# Patient Record
Sex: MALE | Race: WHITE | Employment: FULL TIME | URBAN - METROPOLITAN AREA
[De-identification: names, ages, dates, MRNs, and addresses within clinical notes are randomized per-mention and may not be internally consistent; named-entity substitution may affect disease eponyms.]

---

## 2018-07-11 ENCOUNTER — THERAPY VISIT (OUTPATIENT)
Dept: PHYSICAL THERAPY | Facility: CLINIC | Age: 38
End: 2018-07-11
Payer: COMMERCIAL

## 2018-07-11 DIAGNOSIS — G89.29 CHRONIC RIGHT SHOULDER PAIN: Primary | ICD-10-CM

## 2018-07-11 DIAGNOSIS — M25.511 CHRONIC RIGHT SHOULDER PAIN: Primary | ICD-10-CM

## 2018-07-11 PROCEDURE — 97110 THERAPEUTIC EXERCISES: CPT | Mod: GP | Performed by: PHYSICAL THERAPIST

## 2018-07-11 PROCEDURE — 97161 PT EVAL LOW COMPLEX 20 MIN: CPT | Mod: GP | Performed by: PHYSICAL THERAPIST

## 2018-07-11 PROCEDURE — 97112 NEUROMUSCULAR REEDUCATION: CPT | Mod: GP | Performed by: PHYSICAL THERAPIST

## 2018-07-11 NOTE — PROGRESS NOTES
[facility] Initial Evaluation  Subjective:  Patient is a 38 year old male presenting with rehab right shoulder hpi. The history is provided by the patient. No  was used.   Jaison Pinon is a 38 year old male with a right shoulder condition.  Condition occurred with:  Repetition/overuse.  Condition occurred: other and during recreation/sport.  This is a chronic condition  On or about 1/1/2018, I started to have right shoulder pain that did not go away.  The pain is getting worse.  Right handed.  Played V-ball over the winter, not playing now. .    Patient reports pain:  Anterior.  Radiates to:  Shoulder.  Pain is described as aching (dull) and is constant and reported as 1/10 and 4/10.  Associated symptoms:  Loss of strength (popping). Pain is worse in the P.M..  Symptoms are exacerbated by using arm at shoulder level, using arm overhead, lying on extremity and lifting (push pull) and relieved by ice and rest.  Since onset symptoms are gradually worsening.  Special testing: none.  Previous treatment: none.    General health as reported by patient is good.  Pertinent medical history includes:  Concussions/dizziness.  Medical allergies: no.  Other surgeries include:  None reported.  Current medications:  None as reported by the patient.  Current occupation is Writer, school for a masters in communication.  Patient is working in normal job without restrictions.  Primary job tasks include:  Prolonged sitting and prolonged standing.    Barriers: house.    Red flags:  None as reported by the patient.                        Objective:  Standing Alignment:    Cervical/Thoracic:  Forward head and thoracic kyphosis increased (poor sitting posture.)  Shoulder/UE:  Elevated scapula R (internal rotation of shoulders)    Pelvis deviations alignment: external rotation of legs.              Flexibility/Screens:   Positive screens:  ShoulderNegative screens: Cervical   Upper Extremity:     Decreased left upper extremity flexibility at:  Pectoralis Major; Pectoralis Minor and Latissimus    Decreased right upper extremity flexibility present at:  Pectoralis Major; Pectoralis Minor and Latissimus    Spine:      Decreased right spine flexibility:  Upper Trap and Levator                       Shoulder Evaluation:  ROM:  AROM:    Flexion:  Left:  WFL    Right:  WFL painful arc    Abduction:  Left: WFL   Right:  WFL painful arc      External Rotation:  Right:  70          Flexion/External Rotation:  Left:  Lower neck    Right:  Head painful  Extension/Internal Rotation:  Left:  Lower thoracic    Right:  Lumbar painful      Pain: middle trap 4,4-/5/5, lower 3/5    Strength:    Flexion: Left:5/5   Pain:    Right: 4+/5      Pain:  -/+    Abduction:  Left: 5/5  Pain:    Right: 4+/5      Pain:+  Adduction:  Left: 5/5    Pain:    Right: 5/5     Pain:  Internal Rotation:  Left:5/5     Pain:    Right: 5/5     Pain:  External Rotation:   Left:5/5     Pain:   Right:5/5     Pain:        Elbow Flexion:  Left:5/5     Pain:    Right:5/5     Pain:  Elbow Extension:  Left:4+/5     Pain:    Right:4+/5     Pain:  Stability Testing:      Left shoulder stability negative testing:  Sulcus sign    Right shoulder stability negative testing:  Sulcus sign  Special Tests:      Left shoulder negative for the following special tests:  Impingement  Right shoulder positive for the following special tests:Impingement and Acrimioclavicular  Right shoulder negative for the following special tests:Bursal  Palpation:  Palpation assessed shoulder: pectoralis major minor.    Right shoulder tenderness present at: Acrimioclavicular; Supraspinatus and Subscapularis  Mobility Tests:      Glenohumeral posterior right:  Hypomobile  Glenohumeral inferior right:  Hypomobile          Scapulohumeral rhythm right:  Hypermobile                                   General     ROS    Assessment/Plan:    Patient is a 38 year old male with right side shoulder  complaints.    Patient has the following significant findings with corresponding treatment plan.                Diagnosis 1:  Right shoulder impingement with scapular weakness.  Pain -  manual therapy, self management, education and home program  Decreased joint mobility - manual therapy, therapeutic exercise, therapeutic activity and home program  Decreased strength - therapeutic exercise, therapeutic activities and home program  Impaired muscle performance - neuro re-education and home program  Impaired posture - neuro re-education, therapeutic activities and home program    Therapy Evaluation Codes:   1) History comprised of:   Personal factors that impact the plan of care:      Past/current experiences, Time since onset of symptoms and recreational activity such as volleyball.    Comorbidity factors that impact the plan of care are:      None.     Medications impacting care: None.  2) Examination of Body Systems comprised of:   Body structures and functions that impact the plan of care:      Shoulder.   Activity limitations that impact the plan of care are:      Dressing, Lifting, Throwing, Sleeping and reaching.  3) Clinical presentation characteristics are:   Stable/Uncomplicated.  4) Decision-Making    Low complexity using standardized patient assessment instrument and/or measureable assessment of functional outcome.  Cumulative Therapy Evaluation is: Low complexity.    Previous and current functional limitations:  (See Goal Flow Sheet for this information)    Short term and Long term goals: (See Goal Flow Sheet for this information)     Communication ability:  Patient appears to be able to clearly communicate and understand verbal and written communication and follow directions correctly.  Treatment Explanation - The following has been discussed with the patient:   RX ordered/plan of care  Possible risks and side effects  This patient would benefit from PT intervention to resume normal activities.   Rehab  potential is good.    Frequency:  1 X week, once daily  Duration:  for 6 weeks  Discharge Plan:  Achieve all LTG.  Independent in home treatment program.    Please refer to the daily flowsheet for treatment today, total treatment time and time spent performing 1:1 timed codes.

## 2018-07-11 NOTE — LETTER
[facility]  [address]  [place] [address]  [phone]    2018    Re: Jaison Pinon   :   1980  MRN:  [id number]   REFERRING PHYSICIAN:   Referred Self    [facility]    Date of Initial Evaluation:  2018  Visits:  Rxs Used: 1  Reason for Referral:  Chronic right shoulder pain    EVALUATION SUMMARY    Subjective:  Patient is a 38 year old male presenting with rehab right shoulder hpi. The history is provided by the patient. No  was used.   Jaison Pinon is a 38 year old male with a right shoulder condition.  Condition occurred with:  Repetition/overuse.  Condition occurred: other and during recreation/sport.  This is a chronic condition  On or about 2018, I started to have right shoulder pain that did not go away.  The pain is getting worse.  Right handed.  Played V-ball over the winter, not playing now. .    Patient reports pain:  Anterior.  Radiates to:  Shoulder.  Pain is described as aching (dull) and is constant and reported as 1/10 and 4/10.  Associated symptoms:  Loss of strength (popping). Pain is worse in the P.M..  Symptoms are exacerbated by using arm at shoulder level, using arm overhead, lying on extremity and lifting (push pull) and relieved by ice and rest.  Since onset symptoms are gradually worsening.  Special testing: none.  Previous treatment: none.    General health as reported by patient is good.  Pertinent medical history includes:  Concussions/dizziness.  Medical allergies: no.  Other surgeries include:  None reported.  Current medications:  None as reported by the patient.  Current occupation is Writer, school for a masters in communication.  Patient is working in normal job without restrictions.  Primary job tasks include:  Prolonged sitting and prolonged standing.  Barriers: house.  Red flags:  None as  reported by the patient.                  Objective:  Standing Alignment:    Cervical/Thoracic:  Forward head and thoracic kyphosis increased (poor sitting posture.)  Shoulder/UE:  Elevated scapula R (internal rotation of shoulders)  Pelvis deviations alignment: external rotation of legs.  Flexibility/Screens:   Positive screens:  ShoulderNegative screens: Cervical   Upper Extremity:    Decreased left upper extremity flexibility at:  Pectoralis Major; Pectoralis Minor and Latissimus    Re: Jaison Pinon   :   1980    Decreased right upper extremity flexibility present at:  Pectoralis Major; Pectoralis Minor and Latissimus  Spine:  Decreased right spine flexibility:  Upper Trap and Levator    Shoulder Evaluation:  ROM:  AROM:    Flexion:  Left:  WFL    Right:  WFL painful arc  Abduction:  Left: WFL   Right:  WFL painful arc  External Rotation:  Right:  70  Flexion/External Rotation:  Left:  Lower neck    Right:  Head painful  Extension/Internal Rotation:  Left:  Lower thoracic    Right:  Lumbar painful    Pain: middle trap 4,4-/5/5, lower 3/5  Strength:    Flexion: Left:5/5   Pain:    Right: 4+/5      Pain:  -/+  Abduction:  Left: 5/5  Pain:    Right: 4+/5      Pain:+  Adduction:  Left: 5/5    Pain:    Right: 5/5     Pain:  Internal Rotation:  Left:5/5     Pain:    Right: 5/5     Pain:  External Rotation:   Left:5/5     Pain:   Right:5/5     Pain:    Elbow Flexion:  Left:5/5     Pain:    Right:5/5     Pain:  Elbow Extension:  Left:4+/5     Pain:    Right:4+/5     Pain:  Stability Testing:    Left shoulder stability negative testing:  Sulcus sign  Right shoulder stability negative testing:  Sulcus sign  Special Tests:    Left shoulder negative for the following special tests:  Impingement  Right shoulder positive for the following special tests:Impingement and Acrimioclavicular  Right shoulder negative for the following special tests:Bursal  Palpation:  Palpation assessed shoulder: pectoralis major  minor.  Right shoulder tenderness present at: Acrimioclavicular; Supraspinatus and Subscapularis  Mobility Tests:    Glenohumeral posterior right:  Hypomobile  Glenohumeral inferior right:  Hypomobile    Scapulohumeral rhythm right:  Hypermobile       Assessment/Plan:    Patient is a 38 year old male with right side shoulder complaints.    Patient has the following significant findings with corresponding treatment plan.                Diagnosis 1:  Right shoulder impingement with scapular weakness.  Pain -  manual therapy, self management, education and home program  Decreased joint mobility - manual therapy, therapeutic exercise, therapeutic activity and home program  Decreased strength - therapeutic exercise, therapeutic activities and home program  Impaired muscle performance - neuro re-education and home program  Impaired posture - neuro re-education, therapeutic activities and home program    Therapy Evaluation Codes:   1) History comprised of:   Personal factors that impact the plan of care:    Re: Jaison Pinon   :   1980      Past/current experiences, Time since onset of symptoms and recreational activity such as volleyball.    Comorbidity factors that impact the plan of care are:      None.     Medications impacting care: None.  2) Examination of Body Systems comprised of:   Body structures and functions that impact the plan of care:      Shoulder.   Activity limitations that impact the plan of care are:      Dressing, Lifting, Throwing, Sleeping and reaching.  3) Clinical presentation characteristics are:   Stable/Uncomplicated.  4) Decision-Making    Low complexity using standardized patient assessment instrument and/or measureable assessment of functional outcome.  Cumulative Therapy Evaluation is: Low complexity.    Previous and current functional limitations:  (See Goal Flow Sheet for this information)    Short term and Long term goals: (See Goal Flow Sheet for this information)      Communication ability:  Patient appears to be able to clearly communicate and understand verbal and written communication and follow directions correctly.  Treatment Explanation - The following has been discussed with the patient:   RX ordered/plan of care  Possible risks and side effects  This patient would benefit from PT intervention to resume normal activities.   Rehab potential is good.    Frequency:  1 X week, once daily  Duration:  for 6 weeks  Discharge Plan:  Achieve all LTG.  Independent in home treatment program.    Thank you for your referral.    INQUIRIES  Therapist: Macrina Santos, PT  [facility] PHYSICAL THERAPY  [address] [address]  Phone: [phone]  Fax: [phone]

## 2018-07-11 NOTE — MR AVS SNAPSHOT
"              After Visit Summary   2018    Jaison Pinon    MRN: [id number]           Patient Information     Date Of Birth          1980        Visit Information        Provider Department      2018 8:20 AM [facility]        Today's Diagnoses     Chronic right shoulder pain    -  1       Follow-ups after your visit        Who to contact     If you have questions or need follow up information about today's clinic visit or your schedule please [facility] directly at [phone].  Normal or non-critical lab and imaging results will be communicated to you by Thing5hart, letter or phone within 4 business days after the clinic has received the results. If you do not hear from us within 7 days, please contact the clinic through Thing5hart or phone. If you have a critical or abnormal lab result, we will notify you by phone as soon as possible.  Submit refill requests through Limecraft or call your pharmacy and they will forward the refill request to us. Please allow 3 business days for your refill to be completed.          Additional Information About Your Visit        MyChart Information     Limecraft lets you send messages to your doctor, view your test results, renew your prescriptions, schedule appointments and more. To sign up, go to [email or web address] . Click on \"Log in\" on the left side of the screen, which will take you to the Welcome page. Then click on \"Sign up Now\" on the right side of the page.     You will be asked to enter the access code listed below, as well as some personal information. Please follow the directions to create your username and password.     Your access code is: [email or web address]  Expires: 10/9/2018 10:07 PM     Your access code will  in 90 days. If you need help or a new code, please call your [place] clinic or [phone].        Care " EveryWhere ID     This is your Care EveryWhere ID. This could be used by other organizations to access your [place] medical records  [id number]         Blood Pressure from Last 3 Encounters:   No data found for BP    Weight from Last 3 Encounters:   No data found for Wt              We Performed the Following     SRIDHAR Inital Eval Report     Neuromuscular Re-Education     PT Eval, Low Complexity (91949)     Therapeutic Exercises        Primary Care Provider Fax #    Physician No Ref-Primary [phone]       No address on file        Equal Access to Services     MARYLOU SQUIRES : Hadii aad ku hadasho Soomaali, waaxda luqadaha, qaybta kaalmada adeegyada, waxay idiin hayaan adeeg nicolasararoberto lahugo . So [facility] [phone].    ATENCIÓN: Si habla español, tiene a da silva disposición servicios gratuitos de asistencia lingüística. [facility] [phone].    We comply with applicable federal civil rights laws and [place] laws. We do not discriminate on the basis of race, color, national origin, age, disability, sex, sexual orientation, or gender identity.            Thank you!     Thank you for choosing [facility] PHYSICAL THERAPY  for your care. Our goal is always to provide you with excellent care. Hearing back from our patients is one way we can continue to improve our services. Please take a few minutes to complete the written survey that you may receive in the mail after your visit with us. Thank you!             Your Updated Medication List - Protect others around you: Learn how to safely use, store and throw away your medicines at www.disposemymeds.org.      Notice  As of 7/11/2018 10:07 PM    You have not been prescribed any medications.

## 2018-07-27 ENCOUNTER — THERAPY VISIT (OUTPATIENT)
Dept: PHYSICAL THERAPY | Facility: CLINIC | Age: 38
End: 2018-07-27
Payer: COMMERCIAL

## 2018-07-27 DIAGNOSIS — G89.29 CHRONIC RIGHT SHOULDER PAIN: ICD-10-CM

## 2018-07-27 DIAGNOSIS — M25.511 CHRONIC RIGHT SHOULDER PAIN: ICD-10-CM

## 2018-07-27 PROCEDURE — 97112 NEUROMUSCULAR REEDUCATION: CPT | Mod: GP | Performed by: PHYSICAL THERAPY ASSISTANT

## 2018-07-27 PROCEDURE — 97110 THERAPEUTIC EXERCISES: CPT | Mod: GP | Performed by: PHYSICAL THERAPY ASSISTANT

## 2018-08-17 ENCOUNTER — THERAPY VISIT (OUTPATIENT)
Dept: PHYSICAL THERAPY | Facility: CLINIC | Age: 38
End: 2018-08-17
Payer: COMMERCIAL

## 2018-08-17 DIAGNOSIS — M25.511 CHRONIC RIGHT SHOULDER PAIN: ICD-10-CM

## 2018-08-17 DIAGNOSIS — G89.29 CHRONIC RIGHT SHOULDER PAIN: ICD-10-CM

## 2018-08-17 PROCEDURE — 97112 NEUROMUSCULAR REEDUCATION: CPT | Mod: GP | Performed by: PHYSICAL THERAPY ASSISTANT

## 2018-08-17 PROCEDURE — 97110 THERAPEUTIC EXERCISES: CPT | Mod: GP | Performed by: PHYSICAL THERAPY ASSISTANT

## 2018-08-17 NOTE — MR AVS SNAPSHOT
"              After Visit Summary   2018    Jaison Pinon    MRN: [id number]           Patient Information     Date Of Birth          1980        Visit Information        Provider Department      2018 7:40 AM [facility] Physical Therapy        Today's Diagnoses     Chronic right shoulder pain           Follow-ups after your visit        Who to contact     If you have questions or need follow up information about today's clinic visit or your schedule please contact [facility] directly at [phone].  Normal or non-critical lab and imaging results will be communicated to you by Appspersehart, letter or phone within 4 business days after the clinic has received the results. If you do not hear from us within 7 days, please contact the clinic through Appspersehart or phone. If you have a critical or abnormal lab result, we will notify you by phone as soon as possible.  Submit refill requests through [employer] or call your pharmacy and they will forward the refill request to us. Please allow 3 business days for your refill to be completed.          Additional Information About Your Visit        MyChart Information     [employer] lets you send messages to your doctor, view your test results, renew your prescriptions, schedule appointments and more. To sign up, go to [email or web address] . Click on \"Log in\" on the left side of the screen, which will take you to the Welcome page. Then click on \"Sign up Now\" on the right side of the page.     You will be asked to enter the access code listed below, as well as some personal information. Please follow the directions to create your username and password.     Your access code is: [email or web address]  Expires: 10/9/2018 10:07 PM     Your access code will  in 90 days. If you need help or a new code, please call your [place] clinic or [phone].        Care " EveryWhere ID     This is your Care EveryWhere ID. This could be used by other organizations to access your [place] medical records  [id number]         Blood Pressure from Last 3 Encounters:   No data found for BP    Weight from Last 3 Encounters:   No data found for Wt              We Performed the Following     NEUROMUSCULAR RE-EDUCATION     THERAPEUTIC EXERCISES        Primary Care Provider Fax #    Physician No Ref-Primary [phone]       No address on file        Equal Access to Services     [facility] : Hadii aad ku hadasho Soomaali, waaxda luqadaha, qaybta kaalmada adeegyada, waxay idiin hayaan adeeg kb laKerilance . So [facility] [phone].    ATENCIÓN: Si habla esplonnie, tiene a da silva disposición servicios gratuitos de asistencia lingüística. Donyanatalie al [phone].    We comply with applicable federal civil rights laws and [place] laws. We do not discriminate on the basis of race, color, national origin, age, disability, sex, sexual orientation, or gender identity.            Thank you!     Thank you for [facility] PHYSICAL THERAPY  for your care. Our goal is always to provide you with excellent care. Hearing back from our patients is one way we can continue to improve our services. Please take a few minutes to complete the written survey that you may receive in the mail after your visit with us. Thank you!             Your Updated Medication List - Protect others around you: Learn how to safely use, store and throw away your medicines at www.disposemymeds.org.      Notice  As of 8/17/2018  5:51 PM    You have not been prescribed any medications.

## 2018-11-23 PROBLEM — M25.511 CHRONIC RIGHT SHOULDER PAIN: Status: RESOLVED | Noted: 2018-07-11 | Resolved: 2018-11-23

## 2018-11-23 PROBLEM — G89.29 CHRONIC RIGHT SHOULDER PAIN: Status: RESOLVED | Noted: 2018-07-11 | Resolved: 2018-11-23

## 2018-11-23 NOTE — PROGRESS NOTES
Subjective:  HPI                    Objective:  System    Physical Exam    General     ROS    Assessment/Plan:    ASSESSMENT/PLAN  Updated problem list and treatment plan: Diagnosis 1:  Right shoulder pain   Pain -  self management and home program  Decreased joint mobility - home program and self management  Decreased strength - home program and self management  Decreased function - home program and self management  Impaired posture - home program and self management  STG/LTGs have been met:  Yes (See Goal flow sheet completed today.)  Progress toward STG/LTGs have been made:  Yes (See Goal flow sheet completed today.)  Assessment of Progress: The patient's condition is improving.  The patient has not returned to therapy. Current status is unknown.  Self Management Plans:  Patient is independent in a home treatment program.  Patient is independent in self management of symptoms.    Recommendations:  This patient is ready to be discharged from therapy and continue their home treatment program.  Patient reported doing really well and wanted to exercises on his own.  Patient to call if any questions.  The progress note/discharge summary was written in collaboration with and reviewed by the physical therapist.    Please refer to the daily flowsheet for treatment today, total treatment time and time spent performing 1:1 timed codes.

## 2019-01-04 ENCOUNTER — VIRTUAL VISIT (OUTPATIENT)
Dept: FAMILY MEDICINE | Facility: OTHER | Age: 39
End: 2019-01-04

## 2019-01-04 NOTE — PROGRESS NOTES
"Date:   Clinician: Sonido Crystal  Clinician NPI: [id number]  Patient: Jaison Pinon  Patient : 1980  Patient Address: [address] [place] [address]  Patient Phone: [phone]  Visit Protocol: Oral mouth sores  Patient Summary:  Jaison is a 38 year old ( : 1980 ) male who initiated a Visit for evaluation of oral mouth sores, specifically canker sore. When asked the question \"Please sign me up to receive news, health information and promotions from [email or web address].\", Jaison responded \"No\".    Jaison uploaded images of his condition.   He currently has lip sores. The current sores have been present for 3 days. He notes redness and pain at the affected area. The sores are recurrent and not spreading to other parts of the body. The sores are unilateral and are not grouped in a cluster of lesions. His pain did not precede the appearance of the sores. He has had 3 occurrences in the past three months.   Jaison last had an outbreak 1-2 months ago and has tried to treat the sores in the past. Medication(s) he has used to treat the sores in the past as reported by the patient (free-text): Canker-X, but it?s ineffective for this one   He denies: pus, warmth around the affected skin area, feeling feverish, that the rash is spreading, and having a similar rash on other body parts. The rash is not located near the eyes.   The patient does not smoke or use smokeless tobacco.   MEDICATIONS: No current medications, ALLERGIES: NKDA  Clinician Response:  Dear Jaison,  Based on the information provided, the lesions or sores you have are most likely cold sores, also known as fever blisters.  The technical term is 'herpes simplex virus type 1'. Common symptoms include a tingling sensation on the mouth or lips, fluid filled blisters, or crusting on the skin.   I am prescribing:   Valacyclovir 1 gram oral tablet. Take 2 tablets by mouth every 12 hours for 1 day. If you think your oral sores are " returning, refill the medication and use it according to the instructions. Your prescription includes 1 refill.  To avoid spreading cold sores to other people, or to other parts of your body, try not to itch or scratch the sores.  Scratching or itching the sores may also cause the development of a secondary infection. Be sure to wash your hands with soap and water after touching the sores.  If cold sores do not heal within 2 weeks, please see your healthcare provider  for further evaluation.   Diagnosis: Herpes Simplex (Cold Sores)  Diagnosis ICD: B00.9  Prescription: valacyclovir (Valtrex) 1 gram oral tablet 4 tablet, 1 days supply. Take 2 tablets by mouth every 12 hours for 1 day. Refills: 1, Refill as needed: no, Allow substitutions: yes  Pharmacy: [id number] - [phone] - [address] [place] [address]

## 2019-06-12 ENCOUNTER — COMMUNICATION - HEALTHEAST (OUTPATIENT)
Dept: TELEHEALTH | Facility: CLINIC | Age: 39
End: 2019-06-12

## 2019-06-12 ENCOUNTER — OFFICE VISIT - HEALTHEAST (OUTPATIENT)
Dept: INTERNAL MEDICINE | Facility: CLINIC | Age: 39
End: 2019-06-12

## 2019-06-12 DIAGNOSIS — Z00.00 ROUTINE GENERAL MEDICAL EXAMINATION AT A HEALTH CARE FACILITY: ICD-10-CM

## 2019-06-12 DIAGNOSIS — G89.29 CHRONIC RIGHT SHOULDER PAIN: ICD-10-CM

## 2019-06-12 DIAGNOSIS — J30.89 SEASONAL ALLERGIC RHINITIS DUE TO OTHER ALLERGIC TRIGGER: ICD-10-CM

## 2019-06-12 DIAGNOSIS — M25.511 CHRONIC RIGHT SHOULDER PAIN: ICD-10-CM

## 2019-06-12 ASSESSMENT — MIFFLIN-ST. JEOR: SCORE: 1884.89

## 2019-09-16 ENCOUNTER — THERAPY VISIT (OUTPATIENT)
Dept: PHYSICAL THERAPY | Facility: CLINIC | Age: 39
End: 2019-09-16
Payer: COMMERCIAL

## 2019-09-16 DIAGNOSIS — M25.511 CHRONIC RIGHT SHOULDER PAIN: ICD-10-CM

## 2019-09-16 DIAGNOSIS — G89.29 CHRONIC RIGHT SHOULDER PAIN: ICD-10-CM

## 2019-09-16 PROCEDURE — 97161 PT EVAL LOW COMPLEX 20 MIN: CPT | Mod: GP | Performed by: PHYSICAL THERAPIST

## 2019-09-16 PROCEDURE — 97110 THERAPEUTIC EXERCISES: CPT | Mod: GP | Performed by: PHYSICAL THERAPIST

## 2019-09-16 PROCEDURE — 97140 MANUAL THERAPY 1/> REGIONS: CPT | Mod: GP | Performed by: PHYSICAL THERAPIST

## 2019-09-16 NOTE — LETTER
[facility]  [address]  [facility] [address]  [phone]    2019    Re: Jaison Pinon   :   1980  MRN:  [id number]   REFERRING PHYSICIAN:   Theo Rodriguez    [facility]    Date of Initial Evaluation: 2019  Visits:  Rxs Used: 1  Reason for Referral:  Chronic right shoulder pain    EVALUATION SUMMARY    Subjective:  The history is provided by the patient. No  was used.   Type of problem:  Right shoulder  This is a chronic condition   Problem details: I played volleyball and hurt my shoulder on or about 2018.  No fall, but possible from volleyball.  I had some shoulder pain.  I saw a chiropractor for the neck.  I had therapy which helped with being able to push on the arm and get up,  but the over head is not any better.  I am not playing volleyball due to the right shoulder pain.  .   Patient reports pain:  Anterior (superior). Radiates to:  Upper arm and shoulder. Associated with: clicking, numbness in bilateral arms when running and sleeping. Symptoms are exacerbated by lying on extremity, using arm at shoulder level, using arm overhead, lifting and carrying (running) and relieved by rest.  Jaison Pinon being seen for right shoulder pain .   Problem began 9/10/2019. Where condition occurred: during recreation / sport.Problem occurred: volleyball   and reported as 4/10 (ranges 1-6) on pain scale. General health as reported by patient is excellent. Pertinent medical history includes:  Concussions/dizziness.   Other medical allergies details: none.    Current medications:  Anti-inflammatory.   Primary job tasks include:  Prolonged sitting and computer work.  Pain quality: dull. and is constant. Pain is the same all the time. Since onset symptoms are unchanged. Special tests:  MRI (reported normal). Previous treatment includes  physical therapy and chiropractic. There was mild improvement following previous treatment.   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  Barriers include:  None as reported by patient.  Red flags:  None as reported by patient.                  Objective:  Standing Alignment:    Cervical/Thoracic:  Forward head  Shoulder/UE:  Rounded shoulders (internal rotation of bilateral shoulders)  Flexibility/Screens:   Positive screens:  ShoulderNegative screens: Cervical or Thoracic (tightness)   Upper Extremity:    Decreased left upper extremity flexibility at:  Pectoralis Major; Pectoralis Minor and   Re: Jaison Pinon   :   1980    Latissimus  Decreased right upper extremity flexibility present at:  Pectoralis Major; Pectoralis Minor and Latissimus    Shoulder Evaluation:  ROM:  AROM:    Flexion:  Left:  WFL    Right:  WFL pain at 90 through end range  Abduction:  Left: WFL   Right:  WFL, pain at ninety and to end rnage   Internal Rotation:  Right:  WFL  External Rotation:  Right:  75  Flexion/External Rotation:  Left:  Neck    Right:  Neck   Extension/Internal Rotation:  Left:  Lumbar    Right:  Lumbar    Pain: upper trap 5/5, middle trap 4+/5, lower trap 3+/5  Stability Testing:    Right shoulder stability positive testing:Sulcus sign  Special Tests:    Right shoulder positive for the following special tests:Impingement  Right shoulder negative for the following special tests:Acrimioclavicular  Palpation:    Right shoulder tenderness present at: Biceps; Supraspinatus and Bicipital Groove  Mobility Tests:    Glenohumeral posterior right:  Hypomobile  Glenohumeral inferior right:  Hypomobile    Acromioclavicular right:  Normal    Scapulohumeral rhythm right:  Hypermobile        Assessment/Plan:    Patient is a 39 year old male with right side shoulder complaints.    Patient has the following significant findings with corresponding treatment plan.                 Diagnosis 1:  Right shoulder pain   Pain -  manual therapy, self management, education and home program  Decreased joint mobility - manual therapy, therapeutic exercise, therapeutic activity and home program  Decreased strength - therapeutic exercise, therapeutic activities and home program  Impaired muscle performance - neuro re-education and home program  Decreased function - therapeutic activities and home program  Impaired posture - neuro re-education, therapeutic activities and home program    Therapy Evaluation Codes:   Cumulative Therapy Evaluation is: Low complexity.    Previous and current functional limitations:  (See Goal Flow Sheet for this information)    Short term and Long term goals: (See Goal Flow Sheet for this information)     Communication ability:  Patient appears to be able to clearly communicate and understand verbal and written communication and follow directions correctly.  Treatment Explanation - The following has been discussed with the patient:   RX ordered/plan of care  Possible risks and side effects  This patient would benefit from PT intervention to resume normal activities.   Re: Jaison Pinon   :   1980    Rehab potential is good.    Frequency:  1 X week, once daily  Duration:  for 6 weeks  Discharge Plan:  Achieve all LTG.  Independent in home treatment program.    Thank you for your referral.    INQUIRIES  Therapist: Macrina Santos, PT  [facility]  [address] [address]  Phone: [phone]  Fax: [phone]

## 2019-12-11 PROBLEM — M25.511 CHRONIC RIGHT SHOULDER PAIN: Status: RESOLVED | Noted: 2019-09-16 | Resolved: 2019-12-11

## 2019-12-11 PROBLEM — G89.29 CHRONIC RIGHT SHOULDER PAIN: Status: RESOLVED | Noted: 2019-09-16 | Resolved: 2019-12-11

## 2020-03-11 ENCOUNTER — HEALTH MAINTENANCE LETTER (OUTPATIENT)
Age: 40
End: 2020-03-11

## 2021-01-03 ENCOUNTER — HEALTH MAINTENANCE LETTER (OUTPATIENT)
Age: 41
End: 2021-01-03

## 2021-04-25 ENCOUNTER — HEALTH MAINTENANCE LETTER (OUTPATIENT)
Age: 41
End: 2021-04-25

## 2021-05-29 NOTE — PROGRESS NOTES
ASSESSMENT/PLAN:    1. Routine general medical examination  His history and examination reveal no acute issues. We discussed general health evaluation needs.  Testing is not clearly necessary at this time, with his history and examination.  He can be seen prn and every few years until age 49.     2. Seasonal allergic rhinitis   OTC treatment.     3. Chronic right shoulder pain  This is mild, with good ROM.  No treatment appears indicated.     CHIEF COMPLAINT:  Chief Complaint   Patient presents with     Annual Exam     HISTORY OF PRESENT ILLNESS:  Jaison is a 39 y.o. male presenting to the clinic today for general health evaluation and exam. He feels well overall.  No symptoms that are troublesome.  History of shoulder pain, with injury. Is quite better now, with only occasional discomfort, and not activity limiting.      REVIEW OF SYSTEMS:   Constitutional: no fever, chills, or sweats  Respiratory: No wheezes, cough, shortness of breath  Cardiovascular: No chest pain or palpitations  Gastrointestinal: No nausea, vomiting, diarrhea, dyspepsia, or pain  Musculoskeletal: see HPI  Neurological: No headache, arm or leg numbness or weakness, or gait disturbance  All other systems on reveiw are negative.    PFSH:  Social History     Tobacco Use   Smoking Status Never Smoker   Smokeless Tobacco Never Used     Family History   Problem Relation Age of Onset     Hypertension Mother      Lymphoma Paternal Uncle      Social History     Socioeconomic History     Marital status:      Spouse name: Not on file     Number of children: Not on file     Years of education: Not on file     Highest education level: Not on file   Occupational History     Not on file   Social Needs     Financial resource strain: Not on file     Food insecurity:     Worry: Not on file     Inability: Not on file     Transportation needs:     Medical: Not on file     Non-medical: Not on file   Tobacco Use     Smoking status: Never Smoker      "Smokeless tobacco: Never Used   Substance and Sexual Activity     Alcohol use: Yes     Drug use: Not on file     Sexual activity: Not on file   Lifestyle     Physical activity:     Days per week: Not on file     Minutes per session: Not on file     Stress: Not on file   Relationships     Social connections:     Talks on phone: Not on file     Gets together: Not on file     Attends [employer] service: Not on file     Active member of club or organization: Not on file     Attends meetings of clubs or organizations: Not on file     Relationship status: Not on file     Intimate partner violence:     Fear of current or ex partner: Not on file     Emotionally abused: Not on file     Physically abused: Not on file     Forced sexual activity: Not on file   Other Topics Concern     Not on file   Social History Narrative     Not on file     Past Surgical History:   Procedure Laterality Date     NO PAST SURGERIES       Allergies   Allergen Reactions     Cat Dander Itching     Past Medical History:   Diagnosis Date     Allergic rhinitis      VITALS:  Vitals:    06/12/19 1304   BP: 102/78   Patient Site: Left Arm   Patient Position: Sitting   Cuff Size: Adult Regular   Pulse: 70   SpO2: 99%   Weight: 198 lb 4 oz (89.9 kg)   Height: 6' 3\" (1.905 m)     Wt Readings from Last 3 Encounters:   06/12/19 198 lb 4 oz (89.9 kg)     Body mass index is 24.78 kg/m .  PHYSICAL EXAM:  General Appearance: In no acute distress  /78 (Patient Site: Left Arm, Patient Position: Sitting, Cuff Size: Adult Regular)   Pulse 70   Ht 6' 3\" (1.905 m)   Wt 198 lb 4 oz (89.9 kg)   SpO2 99%   BMI 24.78 kg/m    EYES: Clear, without inflammation, fundi are unremarkable, discs flat   HEENT: Without congestion or inflammation  NECK:  without cervical or axillary adenopathy, thyroid normal  RESPIRATORY: Clear to auscultation  CARDIOVASCULAR: S1, S2, without murmur   ABDOMEN: soft, flat, and non-tender, without mass, rebound, or guarding  RECTAL: " deferred  GENITOURINARY: normal testes and phallus  EXTREMITIES: No joint swelling, no ulcer or edema, I don't find local tenderness on right shoulder.   NEUROLOGIC: Non-focal, no arm or leg  weakness, speech is clear, gait is normal  PSYCHIATRIC: Oriented X 3, without confusion, behavior and affect normal, thinking is clear

## 2021-06-03 VITALS — WEIGHT: 198.25 LBS | HEIGHT: 75 IN | BODY MASS INDEX: 24.65 KG/M2

## 2021-09-22 ENCOUNTER — OFFICE VISIT (OUTPATIENT)
Dept: URGENT CARE | Facility: URGENT CARE | Age: 41
End: 2021-09-22
Payer: COMMERCIAL

## 2021-09-22 VITALS
WEIGHT: 204 LBS | DIASTOLIC BLOOD PRESSURE: 87 MMHG | HEART RATE: 61 BPM | BODY MASS INDEX: 25.5 KG/M2 | SYSTOLIC BLOOD PRESSURE: 137 MMHG | TEMPERATURE: 96.3 F | OXYGEN SATURATION: 97 %

## 2021-09-22 DIAGNOSIS — S01.81XA LACERATION OF FOREHEAD, INITIAL ENCOUNTER: Primary | ICD-10-CM

## 2021-09-22 PROCEDURE — 90715 TDAP VACCINE 7 YRS/> IM: CPT

## 2021-09-22 PROCEDURE — 99207 PR NON-BILLABLE SERV PER CHARTING: CPT

## 2021-09-22 PROCEDURE — 12011 RPR F/E/E/N/L/M 2.5 CM/<: CPT

## 2021-09-22 PROCEDURE — 90471 IMMUNIZATION ADMIN: CPT

## 2021-09-22 ASSESSMENT — ENCOUNTER SYMPTOMS: WOUND: 1

## 2021-09-23 NOTE — PROGRESS NOTES
SUBJECTIVE:   Jaison Pinon is a 41 year old male presenting with a chief complaint of   Chief Complaint   Patient presents with     Urgent Care     Laceration     x 2 hours ran into door       He is an established patient of [facility].  Patient presents with forehead injury after walking into a door frame.  No LOC, no other injury.  Unknown tetnus status.      PMH x:  None  Medications:  None  Allergies:  None  Surgeries:  none        Review of Systems   Skin: Positive for wound.       History reviewed. No pertinent past medical history.  Family History   Problem Relation Age of Onset     Hypertension Mother      Lymphoma Paternal Uncle      No current outpatient medications on file.     Social History     Tobacco Use     Smoking status: Never Smoker     Smokeless tobacco: Never Used   Substance Use Topics     Alcohol use: Yes       OBJECTIVE  /87 (BP Location: Right arm, Patient Position: Sitting, Cuff Size: Adult Regular)   Pulse 61   Temp (!) 96.3  F (35.7  C) (Tympanic)   Wt 92.5 kg (204 lb)   SpO2 97%   BMI 25.50 kg/m      Physical Exam  Vitals and nursing note reviewed.   Constitutional:       Appearance: Normal appearance. He is normal weight.   Eyes:      Extraocular Movements: Extraocular movements intact.      Conjunctiva/sclera: Conjunctivae normal.      Pupils: Pupils are equal, round, and reactive to light.   Cardiovascular:      Rate and Rhythm: Normal rate.   Musculoskeletal:      Comments: Above right eye brow, vertical 1.2 cm very superficial lac.  No edema, ecchymosis.   Skin:     General: Skin is warm and dry.      Findings: No bruising.   Neurological:      General: No focal deficit present.      Mental Status: He is alert.         Labs:  No results found for this or any previous visit (from the past 24 hour(s)).    X-Ray was not done.    ASSESSMENT:    No diagnosis found.     Medical Decision Making:    Differential Diagnosis:  lac    Serious Comorbid Conditions:  Adult:   None    PLAN:    PROCEDURE:  skin cleaned and irrigated.  Dermabond applied and procedure well tolerated by patient.  No creams or lotions to dermabond.  Tylenol/motrin prn.  Tetnus updated.    Followup:    If not improving or if condition worsens, follow up with your Primary Care Provider, If not improving or if conditions worsens over the next 12-24 hours, go to the Emergency Department    There are no Patient Instructions on file for this visit.

## 2021-09-23 NOTE — PATIENT INSTRUCTIONS
Patient Education     Laceration, Skin Adhesive  A laceration is a cut through the skin. You have a laceration that your healthcare provider has closed with skin adhesive, a type of skin glue.  Home care  You may take over-the-counter medicine such as acetaminophen, naproxen, or ibuprofen for pain, unless your provider prescribed another pain medicine. Talk with your healthcare provider before using these medicines if you have chronic liver or kidney disease or ever had a stomach ulcer or gastrointestinal bleeding.  General care    Keep the wound clean and dry. You may shower or bathe as usual, but don't use soaps, lotions, or ointments on the wound area. Don't scrub the wound. After bathing, pat the wound dry with a soft towel.    Don't scratch, rub, or pick at the adhesive film. Don't place tape directly over the film.    Don't apply liquids such as peroxide, ointments, or creams to the wound while the film is in place. These may dissolve the adhesive too soon.    Most skin wounds heal without problems. However, an infection sometimes occurs despite correct treatment. Watch for the signs of infection listed below.    Follow-up care  Follow up with your healthcare provider, or as advised. The adhesive film or skin glue usually falls off in 5 to 10 days.  When to seek medical advice  Call your healthcare provider right away if any of these occur:    Signs of infection:  ? Fever of 100.4 F (38 C) or higher, or as advised by your healthcare provider  ? Increasing pain in the wound  ? Increasing redness or swelling  ? Pus coming from the wound    Wound bleeds more than a small amount or bleeding doesn t stop    Glue comes off earlier than expected and the wound edges come apart    You feel numbness or weakness in the wound area that doesn t go away  Wendy last reviewed this educational content on 8/1/2019 2000-2021 [employer]. All rights reserved. This information is not intended as a substitute  for professional medical care. Always follow your healthcare professional's instructions.

## 2021-10-10 ENCOUNTER — HEALTH MAINTENANCE LETTER (OUTPATIENT)
Age: 41
End: 2021-10-10

## 2022-05-21 ENCOUNTER — HEALTH MAINTENANCE LETTER (OUTPATIENT)
Age: 42
End: 2022-05-21

## 2022-07-11 NOTE — PROGRESS NOTES
[facility] Initial Evaluation  Subjective:  The history is provided by the patient. No  was used.   Type of problem:  Right shoulder    This is a chronic condition   Problem details: I played volleyball and hurt my shoulder on or about 1/2018.  No fall, but possible from volleyball.  I had some shoulder pain.  I saw a chiropractor for the neck.  I had therapy which helped with being able to push on the arm and get up,  but the over head is not any better.  I am not playing volleyball due to the right shoulder pain.  .   Patient reports pain:  Anterior (superior). Radiates to:  Upper arm and shoulder. Associated with: clicking, numbness in bilateral arms when running and sleeping. Symptoms are exacerbated by lying on extremity, using arm at shoulder level, using arm overhead, lifting and carrying (running) and relieved by rest.    Jaison Pinon being seen for right shoulder pain .   Problem began 9/10/2019. Where condition occurred: during recreation / sport.Problem occurred: volleyball   and reported as 4/10 (ranges 1-6) on pain scale. General health as reported by patient is excellent. Pertinent medical history includes:  Concussions/dizziness.   Other medical allergies details: none.    Current medications:  Anti-inflammatory.   Primary job tasks include:  Prolonged sitting and computer work.  Pain quality: dull. and is constant. Pain is the same all the time. Since onset symptoms are unchanged. Special tests:  MRI (reported normal). Previous treatment includes physical therapy and chiropractic. There was mild improvement following previous treatment.   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    Barriers include:  None as reported by patient.  Red flags:  None as reported by patient.                      Objective:  Standing Alignment:    Cervical/Thoracic:  Forward head  Shoulder/UE:  Rounded shoulders (internal  The ECG showed sinus rhythm.  rotation of bilateral shoulders)                  Flexibility/Screens:   Positive screens:  ShoulderNegative screens: Cervical or Thoracic (tightness)   Upper Extremity:    Decreased left upper extremity flexibility at:  Pectoralis Major; Pectoralis Minor and Latissimus    Decreased right upper extremity flexibility present at:  Pectoralis Major; Pectoralis Minor and Latissimus                           Shoulder Evaluation:  ROM:  AROM:    Flexion:  Left:  WFL    Right:  WFL pain at 90 through end range    Abduction:  Left: WFL   Right:  WFL, pain at ninety and to end rnage     Internal Rotation:  Right:  WFL  External Rotation:  Right:  75          Flexion/External Rotation:  Left:  Neck    Right:  Neck   Extension/Internal Rotation:  Left:  Lumbar    Right:  Lumbar      Pain: upper trap 5/5, middle trap 4+/5, lower trap 3+/5      Stability Testing:      Right shoulder stability positive testing:Sulcus sign  Special Tests:      Right shoulder positive for the following special tests:Impingement  Right shoulder negative for the following special tests:Acrimioclavicular  Palpation:      Right shoulder tenderness present at: Biceps; Supraspinatus and Bicipital Groove  Mobility Tests:      Glenohumeral posterior right:  Hypomobile  Glenohumeral inferior right:  Hypomobile    Acromioclavicular right:  Normal        Scapulohumeral rhythm right:  Hypermobile                                   General     ROS    Assessment/Plan:    Patient is a 39 year old male with right side shoulder complaints.    Patient has the following significant findings with corresponding treatment plan.                Diagnosis 1:  Right shoulder pain   Pain -  manual therapy, self management, education and home program  Decreased joint mobility - manual therapy, therapeutic exercise, therapeutic activity and home program  Decreased strength - therapeutic exercise, therapeutic activities and home program  Impaired muscle performance - neuro  re-education and home program  Decreased function - therapeutic activities and home program  Impaired posture - neuro re-education, therapeutic activities and home program    Therapy Evaluation Codes:   Cumulative Therapy Evaluation is: Low complexity.    Previous and current functional limitations:  (See Goal Flow Sheet for this information)    Short term and Long term goals: (See Goal Flow Sheet for this information)     Communication ability:  Patient appears to be able to clearly communicate and understand verbal and written communication and follow directions correctly.  Treatment Explanation - The following has been discussed with the patient:   RX ordered/plan of care  Possible risks and side effects  This patient would benefit from PT intervention to resume normal activities.   Rehab potential is good.    Frequency:  1 X week, once daily  Duration:  for 6 weeks  Discharge Plan:  Achieve all LTG.  Independent in home treatment program.    Please refer to the daily flowsheet for treatment today, total treatment time and time spent performing 1:1 timed codes.

## 2022-07-21 ENCOUNTER — LAB REQUISITION (OUTPATIENT)
Dept: LAB | Facility: CLINIC | Age: 42
End: 2022-07-21

## 2022-07-21 PROCEDURE — 86706 HEP B SURFACE ANTIBODY: CPT | Performed by: INTERNAL MEDICINE

## 2022-07-22 LAB — HBV SURFACE AB SERPL IA-ACNC: 483.9 M[IU]/ML

## 2022-09-18 ENCOUNTER — HEALTH MAINTENANCE LETTER (OUTPATIENT)
Age: 42
End: 2022-09-18

## 2023-06-04 ENCOUNTER — HEALTH MAINTENANCE LETTER (OUTPATIENT)
Age: 43
End: 2023-06-04

## 2024-07-14 ENCOUNTER — HEALTH MAINTENANCE LETTER (OUTPATIENT)
Age: 44
End: 2024-07-14